# Patient Record
Sex: FEMALE | Race: WHITE | URBAN - METROPOLITAN AREA
[De-identification: names, ages, dates, MRNs, and addresses within clinical notes are randomized per-mention and may not be internally consistent; named-entity substitution may affect disease eponyms.]

---

## 2024-06-28 ENCOUNTER — TRANSCRIBE ORDERS (OUTPATIENT)
Facility: HOSPITAL | Age: 59
End: 2024-06-28

## 2024-06-28 DIAGNOSIS — R10.9 RIGHT SIDED ABDOMINAL PAIN: Primary | ICD-10-CM

## 2024-07-12 ENCOUNTER — OFFICE VISIT (OUTPATIENT)
Age: 59
End: 2024-07-12
Payer: COMMERCIAL

## 2024-07-12 VITALS
WEIGHT: 164 LBS | DIASTOLIC BLOOD PRESSURE: 80 MMHG | BODY MASS INDEX: 28 KG/M2 | HEIGHT: 64 IN | HEART RATE: 66 BPM | SYSTOLIC BLOOD PRESSURE: 122 MMHG | OXYGEN SATURATION: 98 %

## 2024-07-12 DIAGNOSIS — R53.83 FATIGUE, UNSPECIFIED TYPE: Primary | ICD-10-CM

## 2024-07-12 DIAGNOSIS — Z76.89 ENCOUNTER TO ESTABLISH CARE: ICD-10-CM

## 2024-07-12 DIAGNOSIS — E78.5 HYPERLIPIDEMIA, UNSPECIFIED HYPERLIPIDEMIA TYPE: ICD-10-CM

## 2024-07-12 DIAGNOSIS — Z71.89 CARDIAC RISK COUNSELING: ICD-10-CM

## 2024-07-12 DIAGNOSIS — R07.9 CHEST PAIN, UNSPECIFIED TYPE: ICD-10-CM

## 2024-07-12 PROCEDURE — 93000 ELECTROCARDIOGRAM COMPLETE: CPT | Performed by: INTERNAL MEDICINE

## 2024-07-12 PROCEDURE — 99204 OFFICE O/P NEW MOD 45 MIN: CPT | Performed by: INTERNAL MEDICINE

## 2024-07-12 ASSESSMENT — PATIENT HEALTH QUESTIONNAIRE - PHQ9
SUM OF ALL RESPONSES TO PHQ9 QUESTIONS 1 & 2: 0
1. LITTLE INTEREST OR PLEASURE IN DOING THINGS: NOT AT ALL
SUM OF ALL RESPONSES TO PHQ QUESTIONS 1-9: 0
2. FEELING DOWN, DEPRESSED OR HOPELESS: NOT AT ALL
SUM OF ALL RESPONSES TO PHQ QUESTIONS 1-9: 0

## 2024-07-12 NOTE — PROGRESS NOTES
Patient: Fely Sweeney  : 1965    Primary Cardiologist: Khadijah Murillo MD  EP Cardiologist:  PCP: Ozzie Spring MD    Today's Date: 2024      ASSESSMENT AND PLAN:     Assessment and Plan:  Chest pain  CCS  Stress echo  Lipoprotein    2.  FH CAD    Follow up after above testing.       ICD-10-CM    1. Fatigue, unspecified type  R53.83 EKG 12 Lead      2. Encounter to establish care  Z76.89 EKG 12 Lead      3. Chest pain, unspecified type  R07.9       4. Cardiac risk counseling  Z71.89           HISTORY OF PRESENT ILLNESS:     History of Present Illness:  Fely Sweeney is a 58 y.o. female referred for chest pain.    Walker, long term.  Now walking gets SOB, associated with CP.    Reports elevated cholesterol   - higher with menopause.  Back to exercising.      No tobacco.       ++ FH CAD - mother heart attacks undergoing chemo.  MGF CAD, heart attacks CABG.  Father CABG x 3.  Uncles  age 51 MI.    Brother had a stroke.  Sister scleroderma.      Prior testing - [facility] - echo told enlarged heart.      Has been tested for lipoprotein (a) - negative.    PAST MEDICAL HISTORY:     No past medical history on file.     No past surgical history on file.    CURRENT MEDICATIONS:    .  Current Outpatient Medications   Medication Sig Dispense Refill    Cyanocobalamin (VITAMIN B-12 PO) Take by mouth      Flaxseed, Linseed, (FLAXSEED OIL PO) Take by mouth      Cholecalciferol (VITAMIN D-3 PO) Take by mouth       No current facility-administered medications for this visit.       No Known Allergies    SOCIAL HISTORY:     Social History     Tobacco Use    Smoking status: Never    Smokeless tobacco: Never   Substance Use Topics    Alcohol use: Yes     Comment: rarely    Drug use: Never       FAMILY HISTORY:     No family history on file.    REVIEW OF SYMPTOMS:     Review of Symptoms:  Negative except as above, all other systems reviewed and are negative for a Comprehensive ROS

## 2024-07-12 NOTE — PROGRESS NOTES
Results  Component Value Reference Range Notes   CT- ABD PEL WTH IV CON ([id number])([id number])  Reviewed date:05/28/2024 09:10:08 PM  Interpretation:  Performing Lab:  Notes/Report:  See Below For Report   TSH + Free T4 ([id number])  Reviewed date:05/17/2024 01:01:12 PM  Interpretation:  Performing Lab:[facility] [address] [id number], Phone - [phone], Director - Rain  Notes/Report:  PATIENT IS FASTING   TSH 2.590 0.450-4.500 uIU/mL     T4,Free(Direct) 1.24 0.82-1.77 ng/dL     Lipid Panel ([id number])  Reviewed date:05/17/2024 01:01:12 PM  Interpretation:  Performing Lab:[facility], [address] [place] [id number], Phone - [phone], Director - Rain  Notes/Report:  PATIENT IS FASTING   Cholesterol, Total 211 100-199 mg/dL     Triglycerides 134 0-149 mg/dL     HDL Cholesterol 44 >39 mg/dL     VLDL Cholesterol Dc 24 5-40 mg/dL     LDL Chol Calc (NIH) 143 0-99 mg/dL       Hemoglobin A1c 5.5

## 2024-07-14 ENCOUNTER — HOSPITAL ENCOUNTER (OUTPATIENT)
Facility: HOSPITAL | Age: 59
Discharge: HOME | End: 2024-07-17
Payer: COMMERCIAL

## 2024-07-14 DIAGNOSIS — R10.9 RIGHT SIDED ABDOMINAL PAIN: ICD-10-CM

## 2024-07-14 PROCEDURE — 6360000004 HC RX CONTRAST MEDICATION: Performed by: NURSE PRACTITIONER

## 2024-07-14 PROCEDURE — A9579 GAD-BASE MR CONTRAST NOS,1ML: HCPCS | Performed by: NURSE PRACTITIONER

## 2024-07-14 PROCEDURE — 2580000003 HC RX 258: Performed by: NURSE PRACTITIONER

## 2024-07-14 PROCEDURE — 72197 MRI PELVIS W/O & W/DYE: CPT

## 2024-07-14 PROCEDURE — 74183 MRI ABD W/O CNTR FLWD CNTR: CPT

## 2024-07-14 RX ORDER — 0.9 % SODIUM CHLORIDE 0.9 %
100 INTRAVENOUS SOLUTION INTRAVENOUS ONCE
Status: COMPLETED | OUTPATIENT
Start: 2024-07-14 | End: 2024-07-14

## 2024-07-14 RX ADMIN — GADOTERIDOL 15 ML: 279.3 INJECTION, SOLUTION INTRAVENOUS at 15:32

## 2024-07-14 RX ADMIN — SODIUM CHLORIDE 100 ML: 900 INJECTION, SOLUTION INTRAVENOUS at 15:32

## 2024-08-04 LAB
CHOLEST SERPL-MCNC: 212 MG/DL (ref 100–199)
HDLC SERPL-MCNC: 47 MG/DL
LDLC SERPL CALC-MCNC: 142 MG/DL (ref 0–99)
TRIGL SERPL-MCNC: 128 MG/DL (ref 0–149)
VLDLC SERPL CALC-MCNC: 23 MG/DL (ref 5–40)

## 2024-08-06 LAB
IMP & REVIEW OF LAB RESULTS: NORMAL
LPA SERPL-SCNC: <8.4 NMOL/L

## 2024-08-19 ENCOUNTER — TELEPHONE (OUTPATIENT)
Age: 59
End: 2024-08-19

## 2024-08-19 NOTE — TELEPHONE ENCOUNTER
Patients scheduled stress echocardiogram on 8/30/2024 at 3:30 has been denied.     Please call or have Dr. Murillo's  call the patient to cancel the stress echocardiogram.     Prior authorization was sent with office note and EKG from DOS 7/12/2024.    Denial information has been scanned to patients chart.     Thank you,     Ivy

## 2024-08-30 ENCOUNTER — HOSPITAL ENCOUNTER (OUTPATIENT)
Facility: HOSPITAL | Age: 59
Discharge: HOME | End: 2024-08-30
Attending: INTERNAL MEDICINE

## 2024-08-30 DIAGNOSIS — Z71.89 CARDIAC RISK COUNSELING: ICD-10-CM

## 2024-08-30 DIAGNOSIS — E78.5 HYPERLIPIDEMIA, UNSPECIFIED HYPERLIPIDEMIA TYPE: ICD-10-CM

## 2024-08-30 PROCEDURE — 75571 CT HRT W/O DYE W/CA TEST: CPT

## 2024-09-06 NOTE — RESULT ENCOUNTER NOTE
Good news!  Your coronary calcium score was ZERO, which is very low risk.    Please call with office with further questions.    Dr. Murillo

## 2024-10-02 ENCOUNTER — TELEPHONE (OUTPATIENT)
Age: 59
End: 2024-10-02

## 2024-10-02 NOTE — TELEPHONE ENCOUNTER
Kyleigh -    She is scheduled for an echo & reg stress test on 10/17 - but the order is for a stress echo.  Which is correct?    Thanks    Elen

## 2024-10-20 DIAGNOSIS — R07.9 CHEST PAIN, UNSPECIFIED TYPE: Primary | ICD-10-CM

## 2024-10-23 ENCOUNTER — TELEPHONE (OUTPATIENT)
Age: 59
End: 2024-10-23

## 2024-10-23 NOTE — TELEPHONE ENCOUNTER
Spoke with patient after ID x2 after using 2 patient identifiers.   Discussed treadmill stress results and recommendation of nuclear stress test.  Patient agreeable to scheduling nuclear stress and scheduled for 10/25. Testing instructions and what to expect during testing discussed.  My chart nuclear instructions also sent.

## 2024-10-31 ENCOUNTER — OFFICE VISIT (OUTPATIENT)
Age: 59
End: 2024-10-31
Payer: COMMERCIAL

## 2024-10-31 VITALS
WEIGHT: 163 LBS | HEIGHT: 64 IN | SYSTOLIC BLOOD PRESSURE: 110 MMHG | RESPIRATION RATE: 16 BRPM | HEART RATE: 77 BPM | OXYGEN SATURATION: 98 % | DIASTOLIC BLOOD PRESSURE: 70 MMHG | BODY MASS INDEX: 27.83 KG/M2

## 2024-10-31 DIAGNOSIS — Z71.89 CARDIAC RISK COUNSELING: ICD-10-CM

## 2024-10-31 DIAGNOSIS — R07.9 CHEST PAIN, UNSPECIFIED TYPE: Primary | ICD-10-CM

## 2024-10-31 PROCEDURE — 99214 OFFICE O/P EST MOD 30 MIN: CPT | Performed by: INTERNAL MEDICINE

## 2024-10-31 ASSESSMENT — PATIENT HEALTH QUESTIONNAIRE - PHQ9
SUM OF ALL RESPONSES TO PHQ QUESTIONS 1-9: 0
2. FEELING DOWN, DEPRESSED OR HOPELESS: NOT AT ALL
1. LITTLE INTEREST OR PLEASURE IN DOING THINGS: NOT AT ALL
SUM OF ALL RESPONSES TO PHQ QUESTIONS 1-9: 0
SUM OF ALL RESPONSES TO PHQ9 QUESTIONS 1 & 2: 0

## 2024-10-31 NOTE — PROGRESS NOTES
Patient: Fely Sweeney  : 1965    Primary Cardiologist: Khadijah Murillo MD  EP Cardiologist:  PCP: Ozzie Spring MD    Today's Date: 10/31/2024      ASSESSMENT AND PLAN:     Assessment and Plan:  Chest pain  CCS ZERO 2024  Stress echo - denies by insurance -  into ETT + TTE  ETT abnormal, possible false positive: 1mm TIFFANY aVR during stress. >2mm ST depression in recovery. No chest pain was reported. Patient exercised >9 min.   TTE 10/2024       Left Ventricle: Normal left ventricular systolic function with an estimated EF of 60 - 65%. Left ventricle size is normal. Normal wall thickness. Normal wall motion. Normal diastolic function.    Aortic Valve: Mild regurgitation.    Mitral Valve: Trace regurgitation.     Lipoprotein (a) negative    Right flank pain and R chest pain  Possible cervical radiculpathy    2.  FH CAD    3.  Left arm pain  Possible cervical radiculopathy, compression    Follow up after above testing.     No diagnosis found.      HISTORY OF PRESENT ILLNESS:     History of Present Illness:  Fely Sweeney is a 59 y.o. female referred for chest pain.    Walker, long term.  Now walking gets SOB, associated with CP.    Reports elevated cholesterol   - higher with menopause.  Back to exercising.      No tobacco.       ++ FH CAD - mother heart attacks undergoing chemo.  MGF CAD, heart attacks CABG.  Father CABG x 3.  Uncles  age 51 MI.    Brother had a stroke.  Sister scleroderma.      Prior testing - [facility] - echo told enlarged heart.      Has been tested for lipoprotein (a) - negative.    PAST MEDICAL HISTORY:     No past medical history on file.     No past surgical history on file.    CURRENT MEDICATIONS:    .  Current Outpatient Medications   Medication Sig Dispense Refill    Cyanocobalamin (VITAMIN B-12 PO) Take by mouth      Flaxseed, Linseed, (FLAXSEED OIL PO) Take by mouth      Cholecalciferol (VITAMIN D-3 PO) Take by mouth       No